# Patient Record
Sex: FEMALE | Race: WHITE | NOT HISPANIC OR LATINO | URBAN - METROPOLITAN AREA
[De-identification: names, ages, dates, MRNs, and addresses within clinical notes are randomized per-mention and may not be internally consistent; named-entity substitution may affect disease eponyms.]

---

## 2017-07-05 ENCOUNTER — OUTPATIENT (OUTPATIENT)
Dept: OUTPATIENT SERVICES | Facility: HOSPITAL | Age: 67
LOS: 1 days | End: 2017-07-05
Payer: MEDICARE

## 2017-07-05 DIAGNOSIS — Z41.9 ENCOUNTER FOR PROCEDURE FOR PURPOSES OTHER THAN REMEDYING HEALTH STATE, UNSPECIFIED: Chronic | ICD-10-CM

## 2017-07-05 DIAGNOSIS — Z96.642 PRESENCE OF LEFT ARTIFICIAL HIP JOINT: Chronic | ICD-10-CM

## 2017-07-05 PROCEDURE — 71020: CPT | Mod: 26

## 2017-07-05 PROCEDURE — 71046 X-RAY EXAM CHEST 2 VIEWS: CPT

## 2017-09-12 ENCOUNTER — APPOINTMENT (OUTPATIENT)
Dept: SURGERY | Facility: CLINIC | Age: 67
End: 2017-09-12
Payer: MEDICARE

## 2017-09-12 VITALS
SYSTOLIC BLOOD PRESSURE: 119 MMHG | WEIGHT: 96.38 LBS | HEART RATE: 82 BPM | TEMPERATURE: 98.2 F | DIASTOLIC BLOOD PRESSURE: 76 MMHG | HEIGHT: 60 IN | OXYGEN SATURATION: 98 % | BODY MASS INDEX: 18.92 KG/M2

## 2017-09-12 PROCEDURE — 99214 OFFICE O/P EST MOD 30 MIN: CPT

## 2017-09-12 RX ORDER — TERIPARATIDE 250 UG/ML
600 INJECTION, SOLUTION SUBCUTANEOUS
Refills: 0 | Status: ACTIVE | COMMUNITY

## 2017-09-12 RX ORDER — ALBUTEROL SULFATE 2.5 MG/3ML
(2.5 MG/3ML) SOLUTION RESPIRATORY (INHALATION)
Refills: 0 | Status: ACTIVE | COMMUNITY

## 2017-09-12 RX ORDER — FLUTICASONE PROPIONATE 110 UG/1
110 AEROSOL, METERED RESPIRATORY (INHALATION)
Refills: 0 | Status: ACTIVE | COMMUNITY

## 2017-09-12 RX ORDER — TOLTERODINE TARTRATE 4 MG/1
4 CAPSULE, EXTENDED RELEASE ORAL
Refills: 0 | Status: ACTIVE | COMMUNITY

## 2018-08-06 ENCOUNTER — OUTPATIENT (OUTPATIENT)
Dept: OUTPATIENT SERVICES | Facility: HOSPITAL | Age: 68
LOS: 1 days | End: 2018-08-06
Payer: MEDICARE

## 2018-08-06 DIAGNOSIS — Z41.9 ENCOUNTER FOR PROCEDURE FOR PURPOSES OTHER THAN REMEDYING HEALTH STATE, UNSPECIFIED: Chronic | ICD-10-CM

## 2018-08-06 DIAGNOSIS — Z96.642 PRESENCE OF LEFT ARTIFICIAL HIP JOINT: Chronic | ICD-10-CM

## 2018-08-06 PROCEDURE — 71046 X-RAY EXAM CHEST 2 VIEWS: CPT

## 2018-08-06 PROCEDURE — 71046 X-RAY EXAM CHEST 2 VIEWS: CPT | Mod: 26

## 2018-08-14 ENCOUNTER — APPOINTMENT (OUTPATIENT)
Dept: SURGERY | Facility: CLINIC | Age: 68
End: 2018-08-14
Payer: MEDICARE

## 2018-08-14 VITALS
DIASTOLIC BLOOD PRESSURE: 69 MMHG | HEIGHT: 60 IN | HEART RATE: 91 BPM | WEIGHT: 102.13 LBS | OXYGEN SATURATION: 97 % | BODY MASS INDEX: 20.05 KG/M2 | TEMPERATURE: 98.4 F | SYSTOLIC BLOOD PRESSURE: 102 MMHG

## 2018-08-14 DIAGNOSIS — I34.1 NONRHEUMATIC MITRAL (VALVE) PROLAPSE: ICD-10-CM

## 2018-08-14 DIAGNOSIS — B44.1 OTHER PULMONARY ASPERGILLOSIS: ICD-10-CM

## 2018-08-14 DIAGNOSIS — E78.00 PURE HYPERCHOLESTEROLEMIA, UNSPECIFIED: ICD-10-CM

## 2018-08-14 DIAGNOSIS — A31.0 PULMONARY MYCOBACTERIAL INFECTION: ICD-10-CM

## 2018-08-14 DIAGNOSIS — M19.90 UNSPECIFIED OSTEOARTHRITIS, UNSPECIFIED SITE: ICD-10-CM

## 2018-08-14 DIAGNOSIS — Z80.1 FAMILY HISTORY OF MALIGNANT NEOPLASM OF TRACHEA, BRONCHUS AND LUNG: ICD-10-CM

## 2018-08-14 DIAGNOSIS — Z60.2 PROBLEMS RELATED TO LIVING ALONE: ICD-10-CM

## 2018-08-14 DIAGNOSIS — J45.909 UNSPECIFIED ASTHMA, UNCOMPLICATED: ICD-10-CM

## 2018-08-14 DIAGNOSIS — I34.0 NONRHEUMATIC MITRAL (VALVE) INSUFFICIENCY: ICD-10-CM

## 2018-08-14 PROCEDURE — 99213 OFFICE O/P EST LOW 20 MIN: CPT

## 2018-08-14 SDOH — SOCIAL STABILITY - SOCIAL INSECURITY: PROBLEMS RELATED TO LIVING ALONE: Z60.2

## 2018-09-11 ENCOUNTER — APPOINTMENT (OUTPATIENT)
Dept: SURGERY | Facility: CLINIC | Age: 68
End: 2018-09-11
Payer: MEDICARE

## 2018-09-11 VITALS
TEMPERATURE: 97.9 F | BODY MASS INDEX: 20.05 KG/M2 | OXYGEN SATURATION: 97 % | WEIGHT: 102.13 LBS | DIASTOLIC BLOOD PRESSURE: 72 MMHG | HEIGHT: 60 IN | SYSTOLIC BLOOD PRESSURE: 99 MMHG | HEART RATE: 93 BPM

## 2018-09-11 PROCEDURE — 99213 OFFICE O/P EST LOW 20 MIN: CPT

## 2018-09-18 ENCOUNTER — OUTPATIENT (OUTPATIENT)
Dept: OUTPATIENT SERVICES | Facility: HOSPITAL | Age: 68
LOS: 1 days | End: 2018-09-18
Payer: MEDICARE

## 2018-09-18 DIAGNOSIS — Z41.9 ENCOUNTER FOR PROCEDURE FOR PURPOSES OTHER THAN REMEDYING HEALTH STATE, UNSPECIFIED: Chronic | ICD-10-CM

## 2018-09-18 DIAGNOSIS — Z96.642 PRESENCE OF LEFT ARTIFICIAL HIP JOINT: Chronic | ICD-10-CM

## 2018-09-18 PROCEDURE — 71046 X-RAY EXAM CHEST 2 VIEWS: CPT

## 2018-09-18 PROCEDURE — 71046 X-RAY EXAM CHEST 2 VIEWS: CPT | Mod: 26

## 2018-10-08 ENCOUNTER — TRANSCRIPTION ENCOUNTER (OUTPATIENT)
Age: 68
End: 2018-10-08

## 2019-03-16 ENCOUNTER — TRANSCRIPTION ENCOUNTER (OUTPATIENT)
Age: 69
End: 2019-03-16

## 2019-04-01 ENCOUNTER — OUTPATIENT (OUTPATIENT)
Dept: OUTPATIENT SERVICES | Facility: HOSPITAL | Age: 69
LOS: 1 days | End: 2019-04-01
Payer: MEDICARE

## 2019-04-01 DIAGNOSIS — Z41.9 ENCOUNTER FOR PROCEDURE FOR PURPOSES OTHER THAN REMEDYING HEALTH STATE, UNSPECIFIED: Chronic | ICD-10-CM

## 2019-04-01 DIAGNOSIS — Z96.642 PRESENCE OF LEFT ARTIFICIAL HIP JOINT: Chronic | ICD-10-CM

## 2019-04-01 PROCEDURE — 71046 X-RAY EXAM CHEST 2 VIEWS: CPT | Mod: 26

## 2019-04-01 PROCEDURE — 71046 X-RAY EXAM CHEST 2 VIEWS: CPT

## 2019-04-11 ENCOUNTER — OUTPATIENT (OUTPATIENT)
Dept: OUTPATIENT SERVICES | Facility: HOSPITAL | Age: 69
LOS: 1 days | Discharge: HOME | End: 2019-04-11
Payer: MEDICARE

## 2019-04-11 ENCOUNTER — RESULT REVIEW (OUTPATIENT)
Age: 69
End: 2019-04-11

## 2019-04-11 DIAGNOSIS — Z41.9 ENCOUNTER FOR PROCEDURE FOR PURPOSES OTHER THAN REMEDYING HEALTH STATE, UNSPECIFIED: Chronic | ICD-10-CM

## 2019-04-11 DIAGNOSIS — Z96.642 PRESENCE OF LEFT ARTIFICIAL HIP JOINT: Chronic | ICD-10-CM

## 2019-04-11 LAB
GRAM STN FLD: SIGNIFICANT CHANGE UP
SPECIMEN SOURCE: SIGNIFICANT CHANGE UP

## 2019-04-11 PROCEDURE — 88112 CYTOPATH CELL ENHANCE TECH: CPT

## 2019-04-11 PROCEDURE — 87070 CULTURE OTHR SPECIMN AEROBIC: CPT

## 2019-04-11 PROCEDURE — 87015 SPECIMEN INFECT AGNT CONCNTJ: CPT

## 2019-04-11 PROCEDURE — 87206 SMEAR FLUORESCENT/ACID STAI: CPT

## 2019-04-11 PROCEDURE — 87116 MYCOBACTERIA CULTURE: CPT

## 2019-04-11 PROCEDURE — 88305 TISSUE EXAM BY PATHOLOGIST: CPT

## 2019-04-11 PROCEDURE — 87102 FUNGUS ISOLATION CULTURE: CPT

## 2019-04-11 PROCEDURE — 88312 SPECIAL STAINS GROUP 1: CPT

## 2019-04-11 PROCEDURE — 31624 DX BRONCHOSCOPE/LAVAGE: CPT

## 2019-04-11 PROCEDURE — 87305 ASPERGILLUS AG IA: CPT

## 2019-04-11 PROCEDURE — 87449 NOS EACH ORGANISM AG IA: CPT

## 2019-04-12 LAB
NIGHT BLUE STAIN TISS: SIGNIFICANT CHANGE UP
SPECIMEN SOURCE: SIGNIFICANT CHANGE UP

## 2019-04-13 LAB
CULTURE RESULTS: SIGNIFICANT CHANGE UP
FUNGITELL B-D-GLUCAN,  BRONCHIAL WASH: SIGNIFICANT CHANGE UP
SPECIMEN SOURCE: SIGNIFICANT CHANGE UP

## 2019-04-14 LAB — GALACTOMANNAN AG SPEC IA-ACNC: <0.5 INDEX — SIGNIFICANT CHANGE UP

## 2019-04-15 LAB — NON-GYNECOLOGICAL CYTOLOGY STUDY: SIGNIFICANT CHANGE UP

## 2019-05-11 LAB
CULTURE RESULTS: SIGNIFICANT CHANGE UP
SPECIMEN SOURCE: SIGNIFICANT CHANGE UP

## 2019-07-24 ENCOUNTER — APPOINTMENT (OUTPATIENT)
Dept: ULTRASOUND IMAGING | Facility: HOSPITAL | Age: 69
End: 2019-07-24
Payer: MEDICARE

## 2019-07-24 ENCOUNTER — OUTPATIENT (OUTPATIENT)
Dept: OUTPATIENT SERVICES | Facility: HOSPITAL | Age: 69
LOS: 1 days | End: 2019-07-24
Payer: MEDICARE

## 2019-07-24 DIAGNOSIS — Z41.9 ENCOUNTER FOR PROCEDURE FOR PURPOSES OTHER THAN REMEDYING HEALTH STATE, UNSPECIFIED: Chronic | ICD-10-CM

## 2019-07-24 DIAGNOSIS — Z96.642 PRESENCE OF LEFT ARTIFICIAL HIP JOINT: Chronic | ICD-10-CM

## 2019-07-24 PROCEDURE — 76700 US EXAM ABDOM COMPLETE: CPT

## 2019-07-24 PROCEDURE — 76700 US EXAM ABDOM COMPLETE: CPT | Mod: 26

## 2019-07-25 LAB
CULTURE RESULTS: SIGNIFICANT CHANGE UP
SPECIMEN SOURCE: SIGNIFICANT CHANGE UP

## 2019-08-15 ENCOUNTER — OUTPATIENT (OUTPATIENT)
Dept: OUTPATIENT SERVICES | Facility: HOSPITAL | Age: 69
LOS: 1 days | End: 2019-08-15
Payer: MEDICARE

## 2019-08-15 ENCOUNTER — APPOINTMENT (OUTPATIENT)
Dept: MRI IMAGING | Facility: HOSPITAL | Age: 69
End: 2019-08-15
Payer: MEDICARE

## 2019-08-15 DIAGNOSIS — Z41.9 ENCOUNTER FOR PROCEDURE FOR PURPOSES OTHER THAN REMEDYING HEALTH STATE, UNSPECIFIED: Chronic | ICD-10-CM

## 2019-08-15 DIAGNOSIS — Z96.642 PRESENCE OF LEFT ARTIFICIAL HIP JOINT: Chronic | ICD-10-CM

## 2019-08-15 PROCEDURE — A9585: CPT

## 2019-08-15 PROCEDURE — 74183 MRI ABD W/O CNTR FLWD CNTR: CPT

## 2019-08-15 PROCEDURE — 74183 MRI ABD W/O CNTR FLWD CNTR: CPT | Mod: 26

## 2019-08-27 ENCOUNTER — APPOINTMENT (OUTPATIENT)
Dept: SURGERY | Facility: CLINIC | Age: 69
End: 2019-08-27
Payer: MEDICARE

## 2019-08-27 VITALS
BODY MASS INDEX: 20.64 KG/M2 | HEIGHT: 60 IN | HEART RATE: 101 BPM | SYSTOLIC BLOOD PRESSURE: 97 MMHG | DIASTOLIC BLOOD PRESSURE: 62 MMHG | WEIGHT: 105.13 LBS | TEMPERATURE: 97.1 F | OXYGEN SATURATION: 98 %

## 2019-08-27 PROCEDURE — 99213 OFFICE O/P EST LOW 20 MIN: CPT

## 2019-08-27 NOTE — ASSESSMENT
[FreeTextEntry1] : 70 y/o F presents here today for follow up on a small benign cystic lesion to her pancreas. Patient appears well. I reviewed the patient's recent MRI which shows the cystic lesion to be slightly larger than previous but with no remarkable concerns. Abdominal US completed could not detect any cystic lesion. The cystic is likely benign. Will have patient obtain blood work to evaluate for any abnormality. Will follow up here in 6 months with a repeat MRI. Will return here sooner if the blood tests shows any abnormality.

## 2019-08-27 NOTE — ADDENDUM
[FreeTextEntry1] : Documented by Audrey Senior acting as a scribe for Dr. Priyank Fry on 08/27/2019\par

## 2019-08-27 NOTE — HISTORY OF PRESENT ILLNESS
[de-identified] : 70 y/o F presents here today for follow up on a small benign cystic lesion to her pancreas. She reports here feeling well. She denies any pain, loss of appetite, abdominal pain, or unintentional weight loss.

## 2019-08-27 NOTE — END OF VISIT
[FreeTextEntry3] : All medical record entries made by the Scribe were at my, Dr. Fry's direction and personally dictated by me on 08/27/2019  I have reviewed the chart and agree that the record accurately reflects my personal performance of the history, physical exam, assessment and plan. I have also personally directed, reviewed, and agreed with the chart.\par \par

## 2019-08-28 LAB
CANCER AG125 SERPL-ACNC: 16 U/ML
CANCER AG19-9 SERPL-ACNC: 8 U/ML
CEA SERPL-MCNC: 0.9 NG/ML

## 2020-02-17 ENCOUNTER — OUTPATIENT (OUTPATIENT)
Dept: OUTPATIENT SERVICES | Facility: HOSPITAL | Age: 70
LOS: 1 days | End: 2020-02-17

## 2020-02-17 ENCOUNTER — APPOINTMENT (OUTPATIENT)
Dept: MRI IMAGING | Facility: CLINIC | Age: 70
End: 2020-02-17
Payer: MEDICARE

## 2020-02-17 DIAGNOSIS — Z96.642 PRESENCE OF LEFT ARTIFICIAL HIP JOINT: Chronic | ICD-10-CM

## 2020-02-17 DIAGNOSIS — Z41.9 ENCOUNTER FOR PROCEDURE FOR PURPOSES OTHER THAN REMEDYING HEALTH STATE, UNSPECIFIED: Chronic | ICD-10-CM

## 2020-02-17 PROCEDURE — 74183 MRI ABD W/O CNTR FLWD CNTR: CPT | Mod: 26

## 2020-02-25 ENCOUNTER — APPOINTMENT (OUTPATIENT)
Dept: SURGERY | Facility: CLINIC | Age: 70
End: 2020-02-25
Payer: MEDICARE

## 2020-02-25 VITALS
OXYGEN SATURATION: 98 % | SYSTOLIC BLOOD PRESSURE: 109 MMHG | BODY MASS INDEX: 21.84 KG/M2 | HEART RATE: 85 BPM | WEIGHT: 111.25 LBS | DIASTOLIC BLOOD PRESSURE: 73 MMHG | TEMPERATURE: 97.4 F | HEIGHT: 60 IN

## 2020-02-25 PROCEDURE — 99213 OFFICE O/P EST LOW 20 MIN: CPT

## 2020-02-25 NOTE — ASSESSMENT
[FreeTextEntry1] : 68 y/o F with small benign cystic lesion on the pancreas. Patient is doing well. She is stable and asymptomatic. MRI results reviewed shows the cyst to be stable with no changes, measuring at 0.5cm (same size as previous). Blood work also reviewed t be WNL, tumor markers were negative. The previous US also shows patient to have a 0.5cm gallbladder polyp. She is to go for a repeat US now to monitor this. Will continue to monitor patient with a repeat MRI in 6 months and also to go for repeat blood work with tumor markers.

## 2020-02-25 NOTE — ADDENDUM
[FreeTextEntry1] : This note was written by Gracy Haddad on 02/25/2020 acting as scribe for Dr. Ogpar

## 2020-02-25 NOTE — HISTORY OF PRESENT ILLNESS
[de-identified] : 70 y/o F with hx of asthma, HLD, small benign cystic lesion on the pancreas, presents here for follow up. She reports here feeling well. She was previously sent to obtain an MRI and blood work to monitor the pancreatic cyst and is here to review the results. She denies any abdominal pain, back pain or unintentional weight loss.\par \par

## 2020-02-25 NOTE — END OF VISIT
[FreeTextEntry3] : All medical record entries made by the Scribe were at my, Dr. Fry's, discretion and personally dictated by me on 02/25/2020. I have reviewed the chart and agree that the record accurately reflects my personal performance of the history, physical exam, assessment and plan. I have also personally directed, reviewed and agreed to the chart.\par

## 2020-03-02 ENCOUNTER — APPOINTMENT (OUTPATIENT)
Dept: ULTRASOUND IMAGING | Facility: HOSPITAL | Age: 70
End: 2020-03-02
Payer: MEDICARE

## 2020-03-02 ENCOUNTER — OUTPATIENT (OUTPATIENT)
Dept: OUTPATIENT SERVICES | Facility: HOSPITAL | Age: 70
LOS: 1 days | End: 2020-03-02
Payer: MEDICARE

## 2020-03-02 DIAGNOSIS — Z41.9 ENCOUNTER FOR PROCEDURE FOR PURPOSES OTHER THAN REMEDYING HEALTH STATE, UNSPECIFIED: Chronic | ICD-10-CM

## 2020-03-02 DIAGNOSIS — Z96.642 PRESENCE OF LEFT ARTIFICIAL HIP JOINT: Chronic | ICD-10-CM

## 2020-03-02 PROCEDURE — 76700 US EXAM ABDOM COMPLETE: CPT

## 2020-03-02 PROCEDURE — 76700 US EXAM ABDOM COMPLETE: CPT | Mod: 26

## 2020-03-06 ENCOUNTER — OUTPATIENT (OUTPATIENT)
Dept: OUTPATIENT SERVICES | Facility: HOSPITAL | Age: 70
LOS: 1 days | End: 2020-03-06
Payer: MEDICARE

## 2020-03-06 DIAGNOSIS — Z96.642 PRESENCE OF LEFT ARTIFICIAL HIP JOINT: Chronic | ICD-10-CM

## 2020-03-06 DIAGNOSIS — Z41.9 ENCOUNTER FOR PROCEDURE FOR PURPOSES OTHER THAN REMEDYING HEALTH STATE, UNSPECIFIED: Chronic | ICD-10-CM

## 2020-03-06 PROCEDURE — 71046 X-RAY EXAM CHEST 2 VIEWS: CPT

## 2020-03-06 PROCEDURE — 71046 X-RAY EXAM CHEST 2 VIEWS: CPT | Mod: 26

## 2020-04-02 LAB
CANCER AG125 SERPL-ACNC: 17 U/ML
CANCER AG19-9 SERPL-ACNC: 8 U/ML
CEA SERPL-MCNC: 1 NG/ML

## 2020-08-25 ENCOUNTER — APPOINTMENT (OUTPATIENT)
Dept: SURGERY | Facility: CLINIC | Age: 70
End: 2020-08-25
Payer: MEDICARE

## 2020-08-25 VITALS
OXYGEN SATURATION: 98 % | TEMPERATURE: 97.2 F | SYSTOLIC BLOOD PRESSURE: 116 MMHG | HEIGHT: 60 IN | HEART RATE: 82 BPM | WEIGHT: 101 LBS | BODY MASS INDEX: 19.83 KG/M2 | DIASTOLIC BLOOD PRESSURE: 74 MMHG

## 2020-08-25 PROCEDURE — 99213 OFFICE O/P EST LOW 20 MIN: CPT

## 2020-08-25 NOTE — HISTORY OF PRESENT ILLNESS
[de-identified] : 71 y/o F with PMHx of asthma, HLD, small benign cystic lesion on the pancreas presents for 6 month follow up. Pt is doing well overall. She had an MRI in February which showed the cystic lesion to be stable.

## 2020-08-25 NOTE — ASSESSMENT
[FreeTextEntry1] : 71 y/o F with PMHx of asthma, HLD, small benign cystic lesion on the pancreas presents for 6 month follow up. We reviewed patient's MRI from 02/20, which demonstrates stable, cystic lesion, and blood tests for tumor markers from 02/25/2020, which were all normal. We discussed US done on 3/2/2020, which demonstrates a gall bladder polyp but no visualization of the cystic lesion. Pt is doing well overall. I wrote a prescription for an MRI that patient should have in ~6 months and then she should follow up here in February 2021.

## 2020-08-25 NOTE — END OF VISIT
[FreeTextEntry3] : All medical record entries made by the Scribe were at my, Dr. Fry's, discretion and personally dictated by me on 08/25/2020. I have reviewed the chart and agree that the record accurately reflects my personal performance of the history, physical exam, assessment and plan. I have also personally directed, reviewed and agreed to the chart.

## 2021-01-26 ENCOUNTER — RESULT REVIEW (OUTPATIENT)
Age: 71
End: 2021-01-26

## 2021-01-26 ENCOUNTER — APPOINTMENT (OUTPATIENT)
Dept: MRI IMAGING | Facility: HOSPITAL | Age: 71
End: 2021-01-26

## 2021-01-26 ENCOUNTER — OUTPATIENT (OUTPATIENT)
Dept: OUTPATIENT SERVICES | Facility: HOSPITAL | Age: 71
LOS: 1 days | End: 2021-01-26
Payer: MEDICARE

## 2021-01-26 DIAGNOSIS — Z41.9 ENCOUNTER FOR PROCEDURE FOR PURPOSES OTHER THAN REMEDYING HEALTH STATE, UNSPECIFIED: Chronic | ICD-10-CM

## 2021-01-26 DIAGNOSIS — Z96.642 PRESENCE OF LEFT ARTIFICIAL HIP JOINT: Chronic | ICD-10-CM

## 2021-01-26 PROCEDURE — 74183 MRI ABD W/O CNTR FLWD CNTR: CPT | Mod: 26,MH

## 2021-01-26 PROCEDURE — 74183 MRI ABD W/O CNTR FLWD CNTR: CPT

## 2021-01-26 PROCEDURE — A9585: CPT

## 2021-01-27 ENCOUNTER — NON-APPOINTMENT (OUTPATIENT)
Age: 71
End: 2021-01-27

## 2021-03-02 ENCOUNTER — APPOINTMENT (OUTPATIENT)
Dept: SURGERY | Facility: CLINIC | Age: 71
End: 2021-03-02
Payer: MEDICARE

## 2021-03-02 VITALS
DIASTOLIC BLOOD PRESSURE: 71 MMHG | HEART RATE: 102 BPM | TEMPERATURE: 97.9 F | WEIGHT: 98.5 LBS | BODY MASS INDEX: 19.34 KG/M2 | HEIGHT: 60 IN | OXYGEN SATURATION: 97 % | SYSTOLIC BLOOD PRESSURE: 117 MMHG

## 2021-03-02 DIAGNOSIS — K83.8 OTHER SPECIFIED DISEASES OF BILIARY TRACT: ICD-10-CM

## 2021-03-02 PROCEDURE — 99213 OFFICE O/P EST LOW 20 MIN: CPT

## 2021-03-02 NOTE — HISTORY OF PRESENT ILLNESS
[de-identified] : 71 y/o F with PMHx of asthma, HLD, small benign cystic lesion on the pancreas presents for follow up. She is doing well overall. States she is asymptomatic. MRI of abdomen done 1/27/21 demonstrates stable pancreatic cystic lesion, common bile duct at upper limits normal in caliber and focal adenomyomatosis gallbladder fundus. Pt states she has gotten a COVID vaccine.

## 2021-03-02 NOTE — ASSESSMENT
[FreeTextEntry1] : 71 y/o F with PMHx of asthma, HLD, small benign cystic lesion on the pancreas presents for follow up. We reviewed recent MRI of abdomen done 1/27/21, which demonstrates stable pancreatic cystic lesion, common bile duct at upper limits normal in caliber and focal adenomyomatosis gallbladder fundus. I informed pt that the cyst is stable and the gallbladder findings are not very concerning and do not need to be addressed. As patient is asymptomatic, will continue with regular surveillance every 6 months.

## 2021-03-02 NOTE — END OF VISIT
[FreeTextEntry3] : All medical record entries made by the Scribe were at my, Dr. Fry's, discretion and personally dictated by me on 03/02/2021. I have reviewed the chart and agree that the record accurately reflects my personal performance of the history, physical exam, assessment and plan. I have also personally directed, reviewed and agreed to the chart.

## 2021-03-02 NOTE — ADDENDUM
[FreeTextEntry1] : This note was written by Gracy Haddad on 03/02/2021 acting as scribe for Dr. Fry.

## 2021-08-24 ENCOUNTER — OUTPATIENT (OUTPATIENT)
Dept: OUTPATIENT SERVICES | Facility: HOSPITAL | Age: 71
LOS: 1 days | End: 2021-08-24
Payer: MEDICARE

## 2021-08-24 ENCOUNTER — APPOINTMENT (OUTPATIENT)
Dept: ULTRASOUND IMAGING | Facility: HOSPITAL | Age: 71
End: 2021-08-24
Payer: MEDICARE

## 2021-08-24 DIAGNOSIS — Z41.9 ENCOUNTER FOR PROCEDURE FOR PURPOSES OTHER THAN REMEDYING HEALTH STATE, UNSPECIFIED: Chronic | ICD-10-CM

## 2021-08-24 DIAGNOSIS — Z96.642 PRESENCE OF LEFT ARTIFICIAL HIP JOINT: Chronic | ICD-10-CM

## 2021-08-24 PROCEDURE — 76700 US EXAM ABDOM COMPLETE: CPT | Mod: 26

## 2021-08-24 PROCEDURE — 76700 US EXAM ABDOM COMPLETE: CPT

## 2021-09-14 ENCOUNTER — RESULT REVIEW (OUTPATIENT)
Age: 71
End: 2021-09-14

## 2021-09-14 ENCOUNTER — APPOINTMENT (OUTPATIENT)
Dept: SURGERY | Facility: CLINIC | Age: 71
End: 2021-09-14
Payer: MEDICARE

## 2021-09-14 VITALS
WEIGHT: 100 LBS | SYSTOLIC BLOOD PRESSURE: 117 MMHG | HEART RATE: 84 BPM | BODY MASS INDEX: 19.63 KG/M2 | HEIGHT: 60 IN | DIASTOLIC BLOOD PRESSURE: 79 MMHG | OXYGEN SATURATION: 97 % | TEMPERATURE: 97.6 F

## 2021-09-14 PROCEDURE — 99213 OFFICE O/P EST LOW 20 MIN: CPT

## 2022-02-24 ENCOUNTER — OUTPATIENT (OUTPATIENT)
Dept: OUTPATIENT SERVICES | Facility: HOSPITAL | Age: 72
LOS: 1 days | End: 2022-02-24
Payer: MEDICARE

## 2022-02-24 DIAGNOSIS — Z41.9 ENCOUNTER FOR PROCEDURE FOR PURPOSES OTHER THAN REMEDYING HEALTH STATE, UNSPECIFIED: Chronic | ICD-10-CM

## 2022-02-24 DIAGNOSIS — Z96.642 PRESENCE OF LEFT ARTIFICIAL HIP JOINT: Chronic | ICD-10-CM

## 2022-02-24 PROCEDURE — 71046 X-RAY EXAM CHEST 2 VIEWS: CPT

## 2022-02-24 PROCEDURE — 71046 X-RAY EXAM CHEST 2 VIEWS: CPT | Mod: 26

## 2022-03-15 ENCOUNTER — NON-APPOINTMENT (OUTPATIENT)
Age: 72
End: 2022-03-15

## 2022-03-16 ENCOUNTER — OUTPATIENT (OUTPATIENT)
Dept: OUTPATIENT SERVICES | Facility: HOSPITAL | Age: 72
LOS: 1 days | End: 2022-03-16
Payer: MEDICARE

## 2022-03-16 ENCOUNTER — APPOINTMENT (OUTPATIENT)
Dept: ULTRASOUND IMAGING | Facility: HOSPITAL | Age: 72
End: 2022-03-16
Payer: MEDICARE

## 2022-03-16 ENCOUNTER — APPOINTMENT (OUTPATIENT)
Dept: MRI IMAGING | Facility: HOSPITAL | Age: 72
End: 2022-03-16
Payer: MEDICARE

## 2022-03-16 ENCOUNTER — APPOINTMENT (OUTPATIENT)
Dept: ORTHOPEDIC SURGERY | Facility: CLINIC | Age: 72
End: 2022-03-16
Payer: MEDICARE

## 2022-03-16 VITALS — RESPIRATION RATE: 14 BRPM | HEIGHT: 60 IN | BODY MASS INDEX: 19.63 KG/M2 | WEIGHT: 100 LBS

## 2022-03-16 DIAGNOSIS — Z41.9 ENCOUNTER FOR PROCEDURE FOR PURPOSES OTHER THAN REMEDYING HEALTH STATE, UNSPECIFIED: Chronic | ICD-10-CM

## 2022-03-16 DIAGNOSIS — Z96.642 PRESENCE OF LEFT ARTIFICIAL HIP JOINT: Chronic | ICD-10-CM

## 2022-03-16 PROCEDURE — 76705 ECHO EXAM OF ABDOMEN: CPT | Mod: 26

## 2022-03-16 PROCEDURE — 74183 MRI ABD W/O CNTR FLWD CNTR: CPT

## 2022-03-16 PROCEDURE — A9585: CPT

## 2022-03-16 PROCEDURE — 74183 MRI ABD W/O CNTR FLWD CNTR: CPT | Mod: 26,MH

## 2022-03-16 PROCEDURE — 73130 X-RAY EXAM OF HAND: CPT | Mod: 50

## 2022-03-16 PROCEDURE — 20605 DRAIN/INJ JOINT/BURSA W/O US: CPT

## 2022-03-16 PROCEDURE — 99204 OFFICE O/P NEW MOD 45 MIN: CPT

## 2022-03-16 PROCEDURE — 76705 ECHO EXAM OF ABDOMEN: CPT

## 2022-04-15 ENCOUNTER — NON-APPOINTMENT (OUTPATIENT)
Age: 72
End: 2022-04-15

## 2022-04-15 ENCOUNTER — APPOINTMENT (OUTPATIENT)
Dept: SURGERY | Facility: CLINIC | Age: 72
End: 2022-04-15
Payer: MEDICARE

## 2022-04-15 ENCOUNTER — APPOINTMENT (OUTPATIENT)
Dept: SURGERY | Facility: CLINIC | Age: 72
End: 2022-04-15

## 2022-04-15 VITALS
TEMPERATURE: 97.1 F | HEIGHT: 60 IN | BODY MASS INDEX: 19.63 KG/M2 | WEIGHT: 100 LBS | SYSTOLIC BLOOD PRESSURE: 152 MMHG | DIASTOLIC BLOOD PRESSURE: 89 MMHG | OXYGEN SATURATION: 99 % | HEART RATE: 90 BPM

## 2022-04-15 DIAGNOSIS — K82.4 CHOLESTEROLOSIS OF GALLBLADDER: ICD-10-CM

## 2022-04-15 PROCEDURE — 99213 OFFICE O/P EST LOW 20 MIN: CPT

## 2022-04-15 NOTE — ADDENDUM
[FreeTextEntry1] : This note was written by Gracy Haddad on 09/14/2021 acting as scribe for Dr. Fry

## 2022-04-15 NOTE — PLAN
[FreeTextEntry1] : tumor marker labs\par cmp\par f/u 6 mo for surveillance MRI and US, consider annual checks thereafter if stable\par continue to follow pcp

## 2022-04-15 NOTE — HISTORY OF PRESENT ILLNESS
[de-identified] : Pt is a 72 y/o F with PMHx of asthma, HLD, small benign cystic lesion on the pancreas presents for 6 month follow up. She is doing well overall. States she continues to be asymptomatic. Denies pain, nausea, vomiting, loss of appetite.  Abdominal US 8/24/21 demonstrates since 3/2/20, interval increase in size of 0.6 cm gallbladder polyp.

## 2022-04-15 NOTE — ASSESSMENT
[FreeTextEntry1] : Pt is a 72 y/o F with PMHx of asthma, HLD, small benign cystic lesion on the pancreas presents for 6 month follow up. Abdominal US 8/24/21 demonstrates since 3/2/20, interval increase in size of 0.6 cm gallbladder polyp. Discussed with patient how I do not recommend operating at this point, but if the polyp increases to 1 cm or more in size, I would most likely recommend an operation. Will order abdominal MRI and US and pt will follow up in 6 months.

## 2022-04-15 NOTE — HISTORY OF PRESENT ILLNESS
[de-identified] : Pt is a 70 y/o F with PMHx of asthma, HLD, small benign cystic lesion on the pancreas presents for 6 month follow up. She is doing well overall. States she continues to be asymptomatic. Denies pain, nausea, vomiting, loss of appetite.  Pt went for annual surveillance exam, MRI and US Abdomen. Pt MRI revealed 0.5cm simple cystic lesion of pancreatic head. US revealed stable 0.7cm GB polyp. Pt mentioned she recently in January had an US, prior to ours in March, revealing 2 small gallstones. Pt most recent US does not reveal this. Explained to pt this may be due to the gallstones being very small. Pt denies any RUQ pn, n,v,fevers. Will continue surveillance MRI and abd US, repeat in 6 mo and can consider annual surveillance thereafter if stable. Pt previously had tumor makers monitored with , will send for repeat labs and cmp

## 2022-04-18 LAB
ALBUMIN SERPL ELPH-MCNC: 4.3 G/DL
ALP BLD-CCNC: 70 U/L
ALT SERPL-CCNC: 18 U/L
ANION GAP SERPL CALC-SCNC: 12 MMOL/L
AST SERPL-CCNC: 24 U/L
BILIRUB SERPL-MCNC: 0.3 MG/DL
BUN SERPL-MCNC: 15 MG/DL
CALCIUM SERPL-MCNC: 9.5 MG/DL
CANCER AG125 SERPL-ACNC: 24 U/ML
CANCER AG19-9 SERPL-ACNC: 11 U/ML
CEA SERPL-MCNC: 0.6 NG/ML
CHLORIDE SERPL-SCNC: 103 MMOL/L
CO2 SERPL-SCNC: 26 MMOL/L
CREAT SERPL-MCNC: 0.61 MG/DL
EGFR: 96 ML/MIN/1.73M2
GLUCOSE SERPL-MCNC: 88 MG/DL
POTASSIUM SERPL-SCNC: 4.2 MMOL/L
PROT SERPL-MCNC: 6.5 G/DL
SODIUM SERPL-SCNC: 141 MMOL/L

## 2022-04-21 ENCOUNTER — APPOINTMENT (OUTPATIENT)
Dept: ORTHOPEDIC SURGERY | Facility: CLINIC | Age: 72
End: 2022-04-21
Payer: MEDICARE

## 2022-04-21 ENCOUNTER — NON-APPOINTMENT (OUTPATIENT)
Age: 72
End: 2022-04-21

## 2022-04-21 PROCEDURE — 99443: CPT | Mod: 95

## 2022-06-14 ENCOUNTER — APPOINTMENT (OUTPATIENT)
Dept: ORTHOPEDIC SURGERY | Facility: CLINIC | Age: 72
End: 2022-06-14
Payer: MEDICARE

## 2022-06-14 VITALS — WEIGHT: 100 LBS | BODY MASS INDEX: 19.63 KG/M2 | RESPIRATION RATE: 16 BRPM | HEIGHT: 60 IN

## 2022-06-14 PROCEDURE — 20605 DRAIN/INJ JOINT/BURSA W/O US: CPT

## 2022-06-14 PROCEDURE — 99214 OFFICE O/P EST MOD 30 MIN: CPT | Mod: 25

## 2022-10-17 ENCOUNTER — RESULT REVIEW (OUTPATIENT)
Age: 72
End: 2022-10-17

## 2022-10-27 ENCOUNTER — APPOINTMENT (OUTPATIENT)
Dept: MRI IMAGING | Facility: HOSPITAL | Age: 72
End: 2022-10-27

## 2022-10-27 ENCOUNTER — APPOINTMENT (OUTPATIENT)
Dept: ULTRASOUND IMAGING | Facility: HOSPITAL | Age: 72
End: 2022-10-27

## 2022-10-27 ENCOUNTER — OUTPATIENT (OUTPATIENT)
Dept: OUTPATIENT SERVICES | Facility: HOSPITAL | Age: 72
LOS: 1 days | End: 2022-10-27
Payer: MEDICARE

## 2022-10-27 DIAGNOSIS — Z41.9 ENCOUNTER FOR PROCEDURE FOR PURPOSES OTHER THAN REMEDYING HEALTH STATE, UNSPECIFIED: Chronic | ICD-10-CM

## 2022-10-27 DIAGNOSIS — Z96.642 PRESENCE OF LEFT ARTIFICIAL HIP JOINT: Chronic | ICD-10-CM

## 2022-10-27 PROCEDURE — 74183 MRI ABD W/O CNTR FLWD CNTR: CPT | Mod: 26,MH

## 2022-10-27 PROCEDURE — 76705 ECHO EXAM OF ABDOMEN: CPT

## 2022-10-27 PROCEDURE — 76705 ECHO EXAM OF ABDOMEN: CPT | Mod: 26

## 2022-10-27 PROCEDURE — A9585: CPT

## 2022-10-27 PROCEDURE — 74183 MRI ABD W/O CNTR FLWD CNTR: CPT

## 2022-11-08 ENCOUNTER — APPOINTMENT (OUTPATIENT)
Dept: SURGERY | Facility: CLINIC | Age: 72
End: 2022-11-08

## 2022-12-15 ENCOUNTER — NON-APPOINTMENT (OUTPATIENT)
Age: 72
End: 2022-12-15

## 2022-12-19 ENCOUNTER — APPOINTMENT (OUTPATIENT)
Dept: ORTHOPEDIC SURGERY | Facility: CLINIC | Age: 72
End: 2022-12-19

## 2022-12-19 VITALS — HEIGHT: 60 IN | WEIGHT: 100 LBS | RESPIRATION RATE: 16 BRPM | BODY MASS INDEX: 19.63 KG/M2

## 2022-12-19 PROCEDURE — 20605 DRAIN/INJ JOINT/BURSA W/O US: CPT

## 2022-12-19 PROCEDURE — 99214 OFFICE O/P EST MOD 30 MIN: CPT | Mod: 25

## 2022-12-19 PROCEDURE — 73140 X-RAY EXAM OF FINGER(S): CPT | Mod: FA

## 2022-12-19 PROCEDURE — 73110 X-RAY EXAM OF WRIST: CPT | Mod: 50

## 2023-04-10 LAB
CANCER AG125 SERPL-ACNC: 23 U/ML
CANCER AG19-9 SERPL-ACNC: 9 U/ML
CEA SERPL-MCNC: 0.9 NG/ML

## 2023-07-10 LAB
CANCER AG125 SERPL-ACNC: 17 U/ML
CANCER AG19-9 SERPL-ACNC: 7 U/ML
CEA SERPL-MCNC: 0.7 NG/ML

## 2023-07-17 ENCOUNTER — RESULT REVIEW (OUTPATIENT)
Age: 73
End: 2023-07-17

## 2023-07-17 ENCOUNTER — APPOINTMENT (OUTPATIENT)
Dept: MRI IMAGING | Facility: HOSPITAL | Age: 73
End: 2023-07-17

## 2023-07-17 ENCOUNTER — OUTPATIENT (OUTPATIENT)
Dept: OUTPATIENT SERVICES | Facility: HOSPITAL | Age: 73
LOS: 1 days | End: 2023-07-17
Payer: MEDICARE

## 2023-07-17 DIAGNOSIS — Z41.9 ENCOUNTER FOR PROCEDURE FOR PURPOSES OTHER THAN REMEDYING HEALTH STATE, UNSPECIFIED: Chronic | ICD-10-CM

## 2023-07-17 DIAGNOSIS — Z96.642 PRESENCE OF LEFT ARTIFICIAL HIP JOINT: Chronic | ICD-10-CM

## 2023-07-17 PROCEDURE — 74183 MRI ABD W/O CNTR FLWD CNTR: CPT | Mod: MH

## 2023-07-17 PROCEDURE — A9585: CPT

## 2023-07-17 PROCEDURE — 74183 MRI ABD W/O CNTR FLWD CNTR: CPT | Mod: 26,MH

## 2023-07-26 ENCOUNTER — APPOINTMENT (OUTPATIENT)
Dept: BARIATRICS | Facility: CLINIC | Age: 73
End: 2023-07-26
Payer: MEDICARE

## 2023-07-26 VITALS
HEIGHT: 60 IN | SYSTOLIC BLOOD PRESSURE: 107 MMHG | BODY MASS INDEX: 20.47 KG/M2 | OXYGEN SATURATION: 98 % | DIASTOLIC BLOOD PRESSURE: 63 MMHG | TEMPERATURE: 98.1 F | WEIGHT: 104.25 LBS | HEART RATE: 79 BPM

## 2023-07-26 DIAGNOSIS — K86.9 DISEASE OF PANCREAS, UNSPECIFIED: ICD-10-CM

## 2023-07-26 PROCEDURE — 99214 OFFICE O/P EST MOD 30 MIN: CPT

## 2023-07-26 NOTE — HISTORY OF PRESENT ILLNESS
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 Pt has been obtaining annual MRI's for surveillance of pancreatic cyst which was most recently performed on 7/17/23 with no change in size since 2011. Tumor markers negative. Pt obtained US in October 2022 to monitor GB polyp which is unchanged and radiology recommending no further surveillance. Pt denies any associated RUQ pn, n,v. Pt advised to contact us should she develop any GB symptoms. Pt otherwise doing great and will obtain MRI next year to monitor pancreatic IMPN. No other concerns at this time.

## 2023-07-26 NOTE — ASSESSMENT
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 Pt has been obtaining annual MRI's for surveillance of pancreatic cyst which was most recently performed on 7/17/23 with no change in size since 2011. Tumor markers negative. Pt obtained US in October 2022 to monitor GB polyp which is unchanged and radiology recommending no further surveillance. Pt denies any associated RUQ pn, n,v. Pt advised to contact us should she develop any GB symptoms. Pt otherwise doing great and will obtain MRI next year to monitor pancreatic IMPN. No other concerns at this time.

## 2023-07-27 ENCOUNTER — APPOINTMENT (OUTPATIENT)
Dept: ORTHOPEDIC SURGERY | Facility: CLINIC | Age: 73
End: 2023-07-27
Payer: MEDICARE

## 2023-07-27 VITALS — BODY MASS INDEX: 20.42 KG/M2 | RESPIRATION RATE: 16 BRPM | HEIGHT: 60 IN | WEIGHT: 104 LBS

## 2023-07-27 DIAGNOSIS — M79.642 PAIN IN LEFT HAND: ICD-10-CM

## 2023-07-27 DIAGNOSIS — M25.522 PAIN IN LEFT ELBOW: ICD-10-CM

## 2023-07-27 PROCEDURE — 99214 OFFICE O/P EST MOD 30 MIN: CPT | Mod: 25

## 2023-07-27 PROCEDURE — 20605 DRAIN/INJ JOINT/BURSA W/O US: CPT | Mod: LT

## 2023-07-27 PROCEDURE — 73070 X-RAY EXAM OF ELBOW: CPT | Mod: LT

## 2023-09-21 NOTE — ADDENDUM
[FreeTextEntry1] : This note was written by Gracy Haddad on 08/25/2020 acting as scribe for Dr. Fry  Ipl Recommendations: Telangiectasias are permanently dilated blood vessels that remain visible on the skin surface.  They are caused by sun damaging the elastin in the blood vessel walls.  Laser is the only to remove the damaged blood vessels.  We offer BBL for diffuse blood vessels and redness and the ND:Yag for stubborn individual vessels.  Several treatments are usually necessary.\\nTopical treatments have not been found to be helpful for removal.\\nGreen or Yellow tinted makeup tend to minimize the appearance of the redness.

## 2023-10-13 ENCOUNTER — EMERGENCY (EMERGENCY)
Facility: HOSPITAL | Age: 73
LOS: 1 days | Discharge: HOME | End: 2023-10-13
Admitting: EMERGENCY MEDICINE
Payer: MEDICARE

## 2023-10-13 VITALS
SYSTOLIC BLOOD PRESSURE: 122 MMHG | TEMPERATURE: 99 F | OXYGEN SATURATION: 97 % | HEART RATE: 85 BPM | RESPIRATION RATE: 18 BRPM | DIASTOLIC BLOOD PRESSURE: 77 MMHG

## 2023-10-13 VITALS
SYSTOLIC BLOOD PRESSURE: 122 MMHG | TEMPERATURE: 99 F | OXYGEN SATURATION: 97 % | RESPIRATION RATE: 18 BRPM | HEART RATE: 85 BPM | HEIGHT: 59 IN | DIASTOLIC BLOOD PRESSURE: 77 MMHG | WEIGHT: 106.92 LBS

## 2023-10-13 DIAGNOSIS — Z41.9 ENCOUNTER FOR PROCEDURE FOR PURPOSES OTHER THAN REMEDYING HEALTH STATE, UNSPECIFIED: Chronic | ICD-10-CM

## 2023-10-13 DIAGNOSIS — Z96.642 PRESENCE OF LEFT ARTIFICIAL HIP JOINT: Chronic | ICD-10-CM

## 2023-10-13 PROCEDURE — 99284 EMERGENCY DEPT VISIT MOD MDM: CPT

## 2023-10-13 PROCEDURE — 99284 EMERGENCY DEPT VISIT MOD MDM: CPT | Mod: 25

## 2023-10-13 PROCEDURE — 70450 CT HEAD/BRAIN W/O DYE: CPT | Mod: MA

## 2023-10-13 PROCEDURE — 73080 X-RAY EXAM OF ELBOW: CPT

## 2023-10-13 PROCEDURE — 70450 CT HEAD/BRAIN W/O DYE: CPT | Mod: 26,MA

## 2023-10-13 PROCEDURE — 73080 X-RAY EXAM OF ELBOW: CPT | Mod: 26,LT

## 2023-10-13 NOTE — ED ADULT NURSE NOTE - NSFALLRISKINTERV_ED_ALL_ED
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
## 2023-10-13 NOTE — ED ADULT NURSE NOTE - NSICDXPASTMEDICALHX_GEN_ALL_CORE_FT
PAST MEDICAL HISTORY:  Aspergillus pneumonia     History of rotator cuff tear left    FENG (mycobacterium avium-intracellulare) infection     Osteoporosis     Torn ligament right wrist

## 2023-10-13 NOTE — ED PROVIDER NOTE - NSCAREINITIATED _GEN_ER
clopidogrel (PLAVIX) 75 MG tablet   famotidine (PEPCID) 20 MG tablet   isosorbide mononitrate (IMDUR) 30 MG extended release tablet   lisinopril (PRINIVIL;ZESTRIL) 20 MG tablet   metoprolol succinate (TOPROL XL) 50 MG extended release tablet   Send to [employer] [phone] Lety Landon(PA)

## 2023-10-13 NOTE — ED PROVIDER NOTE - PHYSICAL EXAMINATION
CONSTITUTIONAL: Well-appearing;  in no apparent distress.   HEAD: Normocephalic; +swelling/tenderness to occiput   EYES: PERRL; EOM intact; conjunctiva and sclera clear  ENT: normal nose; no rhinorrhea; normal pharynx with no erythema or lesions.   NECK: Supple; non-tender;   CARDIOVASCULAR: rrr,  RESPIRATORY: Breathing easily;   MSK: FROM at all extremities, normal tone; L elbow- no ecchymosis, abrasion or deformity. +ttp over olecranon, FROM.   EXT: No cyanosis or edema; N/V intact  SKIN: Normal for age and race; warm; dry; good turgor; no apparent lesions or rash.  neuro: AAO x 3, CN II-XII intact, normal speech, strength 5/5 bilateral upper and lower extremities, sensation intact, cerebellum intact, no ataxia, follows commands appropriately
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
## 2023-10-13 NOTE — ED ADULT NURSE NOTE - NSICDXPASTSURGICALHX_GEN_ALL_CORE_FT
PAST SURGICAL HISTORY:  Elective surgery bilateral bunionectomy    Elective surgery left rotator cuff repaired    Elective surgery right wrist torn ligament repaired    Status post total hip replacement, left

## 2023-10-13 NOTE — ED PROVIDER NOTE - PATIENT PORTAL LINK FT
You can access the FollowMyHealth Patient Portal offered by [facility] by registering at the following website: [email or web address]. By joining [employer]’s FollowMyHealth portal, you will also be able to view your health information using other applications (apps) compatible with our system.

## 2023-10-13 NOTE — ED PROVIDER NOTE - NSFOLLOWUPINSTRUCTIONS_ED_ALL_ED_FT
Contusion    A contusion is a deep bruise. Contusions are the result of a blunt injury to tissues and muscle fibers under the skin. The skin overlying the contusion may turn blue, purple, or yellow. Symptoms also include pain and swelling in the injured area.    SEEK IMMEDIATE MEDICAL CARE IF YOU HAVE ANY OF THE FOLLOWING SYMPTOMS: severe pain, numbness, tingling, pain, weakness, or skin color/temperature change in any part of your body distal to the injury.    Closed Head Injury    A closed head injury is an injury to your head that may or may not involve a traumatic brain injury (TBI). Symptoms of TBI can be short or long lasting and include headache, dizziness, interference with memory or speech, fatigue, confusion, changes in sleep, mood changes, nausea, depression/anxiety, and dulling of senses. Make sure to obtain proper rest which includes getting plenty of sleep, avoiding excessive visual stimulation, and avoiding activities that may cause physical or mental stress. Avoid any situation where there is potential for another head injury, including sports.    SEEK IMMEDIATE MEDICAL CARE IF YOU HAVE ANY OF THE FOLLOWING SYMPTOMS: unusual drowsiness, vomiting, severe dizziness, seizures, lightheadedness, muscular weakness, different pupil sizes, visual changes, or clear or bloody discharge from your ears or nose.

## 2023-10-16 DIAGNOSIS — Z88.6 ALLERGY STATUS TO ANALGESIC AGENT: ICD-10-CM

## 2023-10-16 DIAGNOSIS — S50.02XA CONTUSION OF LEFT ELBOW, INITIAL ENCOUNTER: ICD-10-CM

## 2023-10-16 DIAGNOSIS — Z88.2 ALLERGY STATUS TO SULFONAMIDES: ICD-10-CM

## 2023-10-16 DIAGNOSIS — Z96.642 PRESENCE OF LEFT ARTIFICIAL HIP JOINT: ICD-10-CM

## 2023-10-16 DIAGNOSIS — Z79.82 LONG TERM (CURRENT) USE OF ASPIRIN: ICD-10-CM

## 2023-10-16 DIAGNOSIS — S00.03XA CONTUSION OF SCALP, INITIAL ENCOUNTER: ICD-10-CM
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
## 2023-10-16 DIAGNOSIS — R51.9 HEADACHE, UNSPECIFIED: ICD-10-CM
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
## 2024-02-27 ENCOUNTER — TRANSCRIPTION ENCOUNTER (OUTPATIENT)
Age: 74
End: 2024-02-27

## 2024-02-27 RX ORDER — CHLORHEXIDINE GLUCONATE 213 G/1000ML
1 SOLUTION TOPICAL DAILY
Refills: 0 | Status: DISCONTINUED | OUTPATIENT
Start: 2024-02-28 | End: 2024-02-28

## 2024-02-27 NOTE — ASU PATIENT PROFILE, ADULT - NS PREOP UNDERSTANDS INFO
no solids after 10pm water allowed up to 4am, bring ID and insurance card, no valuables or jewelry, wear comfortable clothing/yes

## 2024-02-27 NOTE — ASU PATIENT PROFILE, ADULT - NSICDXPASTSURGICALHX_GEN_ALL_CORE_FT
PAST SURGICAL HISTORY:  Elective surgery bilateral bunionectomy    Elective surgery left rotator cuff repaired    Elective surgery right wrist torn ligament repaired    H/O Achilles tendon repair     H/O Mycobacterium avium complex infection     OMAR on CPAP     Status post total hip replacement, left     Tear of left biceps muscle repair

## 2024-02-28 ENCOUNTER — TRANSCRIPTION ENCOUNTER (OUTPATIENT)
Age: 74
End: 2024-02-28

## 2024-02-28 ENCOUNTER — OUTPATIENT (OUTPATIENT)
Dept: OUTPATIENT SERVICES | Facility: HOSPITAL | Age: 74
LOS: 1 days | Discharge: HOME | End: 2024-02-28
Payer: MEDICARE

## 2024-02-28 VITALS
TEMPERATURE: 98 F | SYSTOLIC BLOOD PRESSURE: 116 MMHG | OXYGEN SATURATION: 99 % | DIASTOLIC BLOOD PRESSURE: 72 MMHG | HEART RATE: 76 BPM | RESPIRATION RATE: 16 BRPM

## 2024-02-28 VITALS
RESPIRATION RATE: 16 BRPM | HEIGHT: 59 IN | DIASTOLIC BLOOD PRESSURE: 75 MMHG | SYSTOLIC BLOOD PRESSURE: 129 MMHG | OXYGEN SATURATION: 97 % | TEMPERATURE: 98 F | WEIGHT: 106.26 LBS | HEART RATE: 66 BPM

## 2024-02-28 DIAGNOSIS — Z86.19 PERSONAL HISTORY OF OTHER INFECTIOUS AND PARASITIC DISEASES: Chronic | ICD-10-CM

## 2024-02-28 DIAGNOSIS — Z96.642 PRESENCE OF LEFT ARTIFICIAL HIP JOINT: Chronic | ICD-10-CM

## 2024-02-28 DIAGNOSIS — Z98.890 OTHER SPECIFIED POSTPROCEDURAL STATES: Chronic | ICD-10-CM

## 2024-02-28 DIAGNOSIS — Z41.9 ENCOUNTER FOR PROCEDURE FOR PURPOSES OTHER THAN REMEDYING HEALTH STATE, UNSPECIFIED: Chronic | ICD-10-CM

## 2024-02-28 DIAGNOSIS — S46.212A STRAIN OF MUSCLE, FASCIA AND TENDON OF OTHER PARTS OF BICEPS, LEFT ARM, INITIAL ENCOUNTER: Chronic | ICD-10-CM

## 2024-02-28 DIAGNOSIS — G47.33 OBSTRUCTIVE SLEEP APNEA (ADULT) (PEDIATRIC): Chronic | ICD-10-CM

## 2024-02-28 PROCEDURE — 88304 TISSUE EXAM BY PATHOLOGIST: CPT | Mod: 26

## 2024-02-28 RX ORDER — SODIUM CHLORIDE 9 MG/ML
500 INJECTION, SOLUTION INTRAVENOUS
Refills: 0 | Status: DISCONTINUED | OUTPATIENT
Start: 2024-02-28 | End: 2024-02-28

## 2024-02-28 RX ORDER — ACETAMINOPHEN 500 MG
1000 TABLET ORAL ONCE
Refills: 0 | Status: COMPLETED | OUTPATIENT
Start: 2024-02-28 | End: 2024-02-28

## 2024-02-28 RX ORDER — TIZANIDINE 4 MG/1
2 TABLET ORAL
Refills: 0 | DISCHARGE

## 2024-02-28 RX ORDER — UMECLIDINIUM BROMIDE AND VILANTEROL TRIFENATATE 62.5; 25 UG/1; UG/1
1 POWDER RESPIRATORY (INHALATION)
Refills: 0 | DISCHARGE

## 2024-02-28 RX ORDER — APREPITANT 80 MG/1
40 CAPSULE ORAL ONCE
Refills: 0 | Status: COMPLETED | OUTPATIENT
Start: 2024-02-28 | End: 2024-02-28

## 2024-02-28 RX ORDER — ROSUVASTATIN CALCIUM 5 MG/1
1 TABLET ORAL
Refills: 0 | DISCHARGE

## 2024-02-28 RX ORDER — FENTANYL CITRATE 50 UG/ML
25 INJECTION INTRAVENOUS
Refills: 0 | Status: DISCONTINUED | OUTPATIENT
Start: 2024-02-28 | End: 2024-02-28

## 2024-02-28 RX ORDER — ALBUTEROL 90 UG/1
2 AEROSOL, METERED ORAL
Refills: 0 | DISCHARGE

## 2024-02-28 RX ADMIN — APREPITANT 40 MILLIGRAM(S): 80 CAPSULE ORAL at 06:13

## 2024-02-28 RX ADMIN — CHLORHEXIDINE GLUCONATE 1 APPLICATION(S): 213 SOLUTION TOPICAL at 05:55

## 2024-02-28 RX ADMIN — Medication 1000 MILLIGRAM(S): at 06:13
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
## 2024-02-28 NOTE — BRIEF OPERATIVE NOTE - NSICDXBRIEFPROCEDURE_GEN_ALL_CORE_FT
PROCEDURES:  Left knee arthroscopy 28-Feb-2024 08:18:10  Nikos Craig   PROCEDURES:  Left knee arthroscopy 28-Feb-2024 08:18:10 excision of ganglion cyst Nikos Craig   PROCEDURES:  Left knee arthroscopy 28-Feb-2024 08:18:10 excision of ganglion cyst, chondroplasty, patella tendon repair, partial menisectomy Nikos Craig L

## 2024-02-28 NOTE — ASU DISCHARGE PLAN (ADULT/PEDIATRIC) - ASU DC SPECIAL INSTRUCTIONSFT
ACTIVITY:  - Weightbear as tolerated with assistive device if needed. No strenuous activity, heavy lifting, driving or returning to work until cleared by MD.  - You may experience postoperative swelling on the operative extremity. You may ice the surgery site for 20 minute intervals.    DRESSING: gauze/ace wrap  - Keep dressing on till follow up with Dr. Cooley (on Monday 3/4/24). Do not soak in bathtubs. Remove dressing after postop day 7, then leave incision open to air.  - Keep your incision clean and dry. Do not pick at your incision. Do not apply creams, ointments or oils to your incision until cleared by your surgeon. Do not soak your incision in sitting water (ie tubs, pools, lakes, etc.) until cleared by your surgeon.     MEDICATION/ANTICOAGULATION:  -You have been prescribed aspirin, as a preventative to help prevent postoperative blood clots. Please take this medication as prescribed.   - You have been prescribed medications for pain:   Take this medication as prescribed. This medication may cause drowsiness or dizziness. Do not operate machinery. This medication may cause constipation.  - For any additional medications (antibiotics), follow instructions on the bottle.   -Try to have regular bowel movements. Take stool softener or laxative if necessary. You may wish to take Miralax daily until you have regular bowel movements.   - If you have been prescribed Aspirin or an anti-inflammatory, please take Prilosec once a day, before breakfast, until no longer taking Aspirin or anti-inflammatory. This will help protect your stomach.  - If you have a pain management physician, please follow-up with them postoperatively.   - If you experience any negative side effects of your medications, please call your surgeon's office to discuss.    Follow-up:  - Call to schedule an appt with Dr. Cooley for follow up. If you have staples or sutures they will be removed in office.  - Please follow-up with your primary care physician or any other specialist you see postoperatively, if needed.   - Contact your doctor if you experience: fever greater than 101.5, chills, chest pain, difficulty breathing, redness or excessive drainage around the incision, other concerns.

## 2024-02-28 NOTE — ASU DISCHARGE PLAN (ADULT/PEDIATRIC) - CARE PROVIDER_API CALL
Mitchel Cooley; MBBS)  Orthopaedic Surgery  [address]  [place] [address]  Phone: [phone]  Fax: [phone]  Follow Up Time:

## 2024-03-05 LAB — SURGICAL PATHOLOGY STUDY: SIGNIFICANT CHANGE UP

## 2024-04-05 ENCOUNTER — EMERGENCY (EMERGENCY)
Facility: HOSPITAL | Age: 74
LOS: 1 days | Discharge: HOME | End: 2024-04-05
Admitting: EMERGENCY MEDICINE
Payer: MEDICARE

## 2024-04-05 VITALS
HEIGHT: 59 IN | TEMPERATURE: 98 F | WEIGHT: 108.03 LBS | RESPIRATION RATE: 18 BRPM | OXYGEN SATURATION: 99 % | HEART RATE: 62 BPM | DIASTOLIC BLOOD PRESSURE: 69 MMHG | SYSTOLIC BLOOD PRESSURE: 116 MMHG

## 2024-04-05 DIAGNOSIS — G47.33 OBSTRUCTIVE SLEEP APNEA (ADULT) (PEDIATRIC): Chronic | ICD-10-CM

## 2024-04-05 DIAGNOSIS — Z98.890 OTHER SPECIFIED POSTPROCEDURAL STATES: Chronic | ICD-10-CM

## 2024-04-05 DIAGNOSIS — Z41.9 ENCOUNTER FOR PROCEDURE FOR PURPOSES OTHER THAN REMEDYING HEALTH STATE, UNSPECIFIED: Chronic | ICD-10-CM

## 2024-04-05 DIAGNOSIS — Z96.642 PRESENCE OF LEFT ARTIFICIAL HIP JOINT: Chronic | ICD-10-CM

## 2024-04-05 DIAGNOSIS — Z86.19 PERSONAL HISTORY OF OTHER INFECTIOUS AND PARASITIC DISEASES: Chronic | ICD-10-CM

## 2024-04-05 DIAGNOSIS — S46.212A STRAIN OF MUSCLE, FASCIA AND TENDON OF OTHER PARTS OF BICEPS, LEFT ARM, INITIAL ENCOUNTER: Chronic | ICD-10-CM

## 2024-04-05 PROCEDURE — 73562 X-RAY EXAM OF KNEE 3: CPT | Mod: 26,RT

## 2024-04-05 PROCEDURE — 70450 CT HEAD/BRAIN W/O DYE: CPT | Mod: MC

## 2024-04-05 PROCEDURE — 99284 EMERGENCY DEPT VISIT MOD MDM: CPT

## 2024-04-05 PROCEDURE — 70450 CT HEAD/BRAIN W/O DYE: CPT | Mod: 26,MC

## 2024-04-05 PROCEDURE — 99284 EMERGENCY DEPT VISIT MOD MDM: CPT | Mod: 25

## 2024-04-05 PROCEDURE — 73562 X-RAY EXAM OF KNEE 3: CPT

## 2024-04-05 RX ORDER — ACETAMINOPHEN 500 MG
1000 TABLET ORAL ONCE
Refills: 0 | Status: COMPLETED | OUTPATIENT
Start: 2024-04-05 | End: 2024-04-05

## 2024-04-05 RX ADMIN — Medication 1000 MILLIGRAM(S): at 11:27

## 2024-04-05 NOTE — ED PROVIDER NOTE - MDM ORDERS SUBMITTED SELECTION
Called patient and informed him of H&E/pathology shows a contact dermatitis, no organisms seen which is reassuring for infection. However, cultures are still pending. In meantime safe to start topical triamcinolone as discussed to site BID. Will f/u once cultures are complete.    Patient did not have any questions      Imaging Studies/Medications

## 2024-04-05 NOTE — ED PROVIDER NOTE - CHIEF COMPLAINT
[facility]    Progress Note    Ariel Barboza Patient Status:  Inpatient    1956 MRN [id number]   Location [address] [facility] Attending Ry Magaña MD   Hosp Day # 7 PCP JAELYN VALLES        Subjective:     Constitutional: Neg 05/05/2020    ALT 16 05/05/2020    DDIMER 3.93 (H) 05/09/2020    CRP 1.27 (H) 05/09/2020    TROP <0.045 05/05/2020                        Morris Collins MD  5/12/2020 The patient is a 73y Female complaining of head injury.

## 2024-04-05 NOTE — ED ADULT NURSE NOTE - NSFALLUNIVINTERV_ED_ALL_ED
Bed/Stretcher in lowest position, wheels locked, appropriate side rails in place/Call bell, personal items and telephone in reach/Instruct patient to call for assistance before getting out of bed/chair/stretcher/Non-slip footwear applied when patient is off stretcher/[place] to call system/Physically safe environment - no spills, clutter or unnecessary equipment/Purposeful proactive rounding/Room/bathroom lighting operational, light cord in reach

## 2024-04-05 NOTE — ED PROVIDER NOTE - MUSCULOSKELETAL, MLM
Spine appears normal, range of motion is not limited, no muscle or joint tenderness; R knee: + contusion to anterior aspect, no patella tend, quads intact, FROM, normal gait
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
## 2024-04-05 NOTE — ED PROVIDER NOTE - CLINICAL SUMMARY MEDICAL DECISION MAKING FREE TEXT BOX
pt s/p fall - injured r knee and hit forehead, no loc/ha, ambulatory w/o dif, + contusion to head and knee, ct head neg, xray knee neg, ace wrap applied for ambulatory comfort / support, given tyl, to RICE, head injury precautions discussed, stable for dc, pt understands and agrees w/plan, strict return precautions given

## 2024-04-05 NOTE — ED PROVIDER NOTE - OBJECTIVE STATEMENT
The pt is a 74 y/o F, who presents to ED s/p fall - hit head and landed on R knee. Pt states pain is 3/10, has not taken any pain meds, no blood thinners. Denies loc, visual changes, n/v, neck or back pain, inability to walk, any other injuries.

## 2024-04-05 NOTE — ED ADULT NURSE NOTE - CAS DISCH CONDITION
Protecting Yourself From the Sun    · Apply broad spectrum water resistant sunscreen with an SPF of at least 30 to exposed areas of the skin. Dont forget the ears and lips! Remember to reapply sunscreen about every 2 hours and after swimming or sweating. · Wear sun protective clothing. Swim shirts (aka. rash guards) are a great idea and negates the need to reapply sunscreen in those areas. · Seek the shade whenever possible especially between the hours of 10 am and 4 pm when the suns rays are the strongest.     · Avoid tanning beds       Biopsy Wound Care Instructions    · Keep the bandage in place for 24 hours. · Cleanse the wound with mild soapy water daily   Gently dry the area.  Apply Vaseline or petroleum jelly to the wound using a cotton tipped applicator.  Cover with a clean bandage.  Repeat this process until the biopsy site is healed.  If you had stitches placed, continue treating the site until the stitches are removed. Remember to make an appointment to return to have your stitches removed by our staff.  You may shower and bathe as usual.       ** Biopsy results generally take around 7 business days to come back. If you have not heard from us by then, please call the office at [phone] between 8AM and 4PM Monday through Friday.     ** REMOVE STITCH(ES) IN 14 DAYS **
Stable

## 2024-04-05 NOTE — ED ADULT NURSE NOTE - NSHOSCREENINGQ1_ED_ALL_ED
Nutrition/ Diabetes MNT Follow-up Progress Report    Rahel Tavarez was seen from 12:25 to 1:25 pm for diabetes self-management training, including nutrition and diabetes education in an individual setting for a diagnosis of Type 2 diabetes mellitus with complication, with long-term current use of insulin (CMS/Formerly [facility]) [E11.8, Z79.4]      Barriers and Readiness to Learning:  The instruction was given to the patient/Caregiver/ present: NONE  Barriers to self-care and learning limitations:physical limitations and financial limitations      Preferences for Learning: visual/verbal and observation, reading  and repetition   Readiness to learn: The patient demonstrates the ability to understand and asks questions.    Assessment and Plan:  Learning needs were assessed and the patient requires education in diabetes disease process/treatment options, medical nutrition therapy, physical acitivity, blood glucose monitoring, diabetes medications, acute complications, chronic complications, diabetes psychosocial adjustment and strategies and goal setting.     Material was presented using verbal, written, demonstration and return demonstration.     Assessment:  Food and Nutrition Related Changes since last visit:  Reports drinking more water now in place of regular soda.  Upon questioning, admits she still does drink 20-24 ounces of soda daily.    Self-reported adherence score: good    Physical Activity changes:   In rehab due to 12 day hospital stay and using a walker    Anthropometric Measurements:  Estimated body mass index is 39.58 kg/m² as calculated from the following:    Height as of 7/19/17: 5' 4\" (1.626 m).    Weight as of 7/19/17: 104.6 kg.    Biochemical Data, Medical Tests, and Procedures:  Hemoglobin A1C (%)   Date Value   07/19/2017 6.4 (H)   03/06/2017 6.5 (H)   06/13/2016 6.0 (H)   01/05/2016 5.4   08/03/2015 6.9 (H)      Glucose (mg/dL)   Date Value   07/19/2017 176 (H)   07/03/2017 128 (H)   07/02/2017  185 (H)     CHOLESTEROL (mg/dL)   Date Value   02/29/2016 144      HDL (mg/dL)   Date Value   02/29/2016 39 (L)    No components found for: LDL   TRIGLYCERIDE (mg/dL)   Date Value   02/29/2016 125      CALCULATED LDL (mg/dL)   Date Value   02/29/2016 80    No components found for: MICROALBUMIN    Current medications, vitamins, herbals, and nonprescription medications:      Current Outpatient Prescriptions   Medication Sig Dispense Refill   • furosemide (LASIX) 20 MG tablet Take 1 tablet by mouth daily. 30 tablet 0   • allopurinol (ZYLOPRIM) 300 MG tablet Take 1 tablet by mouth daily. 30 tablet 0   • amLODIPine (NORVASC) 10 MG tablet Indications: High Blood Pressure Take 1 tablet by mouth daily 30 tablet 0   • metoPROLOL (TOPROL-XL) 100 MG 24 hr tablet Take 1 tablet by mouth daily. 30 tablet 0   • pregabalin (LYRICA) 50 MG capsule Take 1 capsule by mouth every 12 hours. 60 capsule 0   • Apremilast (OTEZLA) 30 MG Tab Take 1 tablet by mouth 2 times daily. 60 tablet 0   • predniSONE (DELTASONE) 10 MG tablet Take two tablets daily for five days, one tablet daily for 5 days 15 tablet 0   • oxyCODONE/APAP (PERCOCET)  MG per tablet Take 1 tablet by mouth every 6 hours as needed for Pain. 120 tablet 0   • pramipexole (MIRAPEX) 0.5 MG tablet Take 1 tablet by mouth at bedtime. 30 tablet 1   • Insulin Glargine (TOUJEO SOLOSTAR SC) Inject 38 Units into the skin daily.      • Insulin Glulisine (APIDRA SOLOSTAR SC) Inject into the skin 3 times daily. Inject per sliding scale     • levothyroxine (SYNTHROID, LEVOTHROID) 150 MCG tablet Take 150 mcg by mouth daily.     • pantoprazole (PROTONIX) 40 MG tablet Take 1 tablet by mouth daily. 90 tablet 3   • clopidogrel (PLAVIX) 75 MG tablet TAKE ONE TABLET BY MOUTH EVERY DAY 90 tablet 3   • ergocalciferol (DRISDOL) 97347 units capsule Take 1 capsule by mouth once a week. 12 capsule 3   • atorvastatin (LIPITOR) 20 MG tablet Take 1 tablet by mouth nightly. Indications: High cholesterol  30 tablet 6   • alendronate (FOSAMAX) 70 MG tablet Take 1 tablet by mouth every 7 days. 12 tablet 4   • acetaminophen (TYLENOL) 500 MG tablet Take 500 mg by mouth every 6 hours as needed for Pain or Fever.     • chlorzoxazone (PARAFON FORTE) 500 MG tablet Take 500 mg by mouth 3 times daily as needed for Muscle spasms.     • tamsulosin (FLOMAX) 0.4 MG Cap Take 1 capsule by mouth daily after a meal. 30 capsule 2   • busPIRone (BUSPAR) 10 MG tablet Take 1 tablet by mouth 2 times daily. 60 tablet 2   • buPROPion (WELLBUTRIN XL) 300 MG 24 hr tablet Take 1 tablet by mouth every morning. 30 tablet 6   • DULoxetine (CYMBALTA) 60 MG capsule Take 1 capsule by mouth 2 times daily. For anxiety 60 capsule 6   • zolpidem (AMBIEN) 5 MG tablet Take 1 tablet by mouth nightly as needed for Sleep. 30 tablet 5   • aspirin 81 MG EC tablet TAKE ONE TABLET BY MOUTH EVERY EVENING 90 tablet 3   • fluticasone (FLONASE) 50 MCG/ACT nasal spray INSTILL 2 SPRAYS INTO EACH NOSTRIL ONCE DAILY AS NEEDED FOR ALLERGIES 16 g 5   • albuterol 108 (90 BASE) MCG/ACT inhaler Inhale 2 puffs into the lungs every 4 hours as needed for Shortness of Breath or Wheezing. 1 Inhaler 0     No current facility-administered medications for this visit.        Nutrition Diagnosis:  No changes since initial/last visit .    Nutrition Prescription:  No changes since initial/last visit.    Intervention:  Nutrition-Related Medication Management  Medications ND-6.1: is not taking meal time insulin regularly.    Nutrition Education:   Nutrition Education - Content (1)   Focused on:     Purpose of the nutrition education E-1.1:  review.     Nutrition Education - Application (2)    Skill development in the following areas E-2.2:  Nutrition - Effect of timing, amount and type of carbohydrates on blood sugar,  Effect of portion size,  Effect of modest weight loss on blood sugar control,  Understanding of healthy food preparation (cooking methods, recipe modifcation),  Other STOP  DRINKING REGULAR SODA!  Competent .      Diabetes Disease Process and Treatment Options:  Causes, risk factors, Importance of diabetes control, Ongoing education and Possible treatment changes/options.  Competent    Physical Activity - Incorporating Activity into Lifestyle       Effects of physical activity on blood sugar, general health benefits, hypoglycemia prevention and Developing a physical activity plan/goals (types, frequency, duration, intensity, medical clearance)  Competent                                             Blood Glucose Monitoring - Blood Sugars:  FBS ranges at home: 100-170 mg/dL  Ranges of other home BS:   BS at office visit:    Reviewed testing technique, times, record keeping, blood sugar targets, and sharps disposal.      Competent    Diabetes Medications  (dose, schedule, action, and side effects)  Competent    Acute Complications - Prevention Detection and Treatment:  Hypoglycemia (risks, causes, signs, symptoms, treatment, and prevention), Hyperglycemia (risks, causes, signs, symptoms, treatment, and prevention), Problem solving and when to call their provider, Safe driving practices and Medical identification use.  Competent    Chronic Complications - Prevention, Detection & Treatment:  Risk reduction strategies for prevention and treatment of nephropathy, retinopathy, neuropathy, frequent infections, cardiovascular disease, Essential care guidelines (MD/Dental,Eye exams, Lower extremity exams), Daily self exams of feet, Signs/symptoms of problems or infections, Skin care, skin hygiene hygiene and General health benefits.  Competent    Diabetes Psychosocial Adjustment and Strategies:  Effects of stress on blood glucose, healthy coping strategies and problem-solving to identify individual stress triggers  Provided with community resources and support information  Competent      Nutrition Counseling:      Theoretical Basis/Approach (1)   Cognitive-Behavioral Theory, Health Belief Model  and Social Learning Model   Utilizing:  Motivational interviewing, Goal setting, Self-monitoring, Problem solving, Social support and Stress management.    Coordination of Nutrition Care:   NONE      Print/Written Resources Provided:   BOOK ON DIABETIC EYE DISEASE    Plan to evaluate Conflict with personal/family value system:  BRINGS HOME REGULAR SODA AND IS FINE WITH HER DRINKING IT DESPITE DIABETES  Motivation: USURE WHETHER SHE IS MOTIVATED TO CHANGE THIS HABIT.  SHE HAS CUT DOWN ON SODA INTAKE.  USED TO DRINK ONLY SODA AND NO WATER.  Readiness to change nutrition-related behaviors: UNSURE, ENCOURAGEMENT GIVEN  Unscientific belifes/attitudes: BELIEVES THAT DIET SODA IS BAD FOR YOU, THEREFORE, ONE SHOULD DRINK REGULAR SODA.    Goal Setting to Promote Health & Problem Solving for Daily Living was discussed.  PREVIOUS GOAL:  Check blood sugars 2 times per day.  Patient achieved goal 25% of the time.  Patient selected goal of:   No sugared drinks.  Choose water and aim for 48-64 ounces each day  Patient achieved goal NEW% of the time.  The patient will carry their diabetes identification at all times.  Patient achieved goal 100% of the time.    Diabetes Self-Management Support Plan:    Diabetes Support Group X Family Support    Diabetes Support Website  Exercise Facility:   X Diabetes Educator Contact Number  [employer] [facility]:    Care Management Program  Food/Weight Counseling Program    Patient Assistance Program  Annual Program Review    Community Program: Living Well with Chronic Conditions Classes  Behavioral Change Specialist    Community Program: Healthy Living with Diabetes  Websites: [email or web address].org    Exercise partner  Lunch rossana       The patient was encouraged to call back with any questions or concerns.    Return in about 4 months (around 11/24/2017).    See Patient Instructions for further information.    Thank you for your referral.    Please contact me with any questions/concerns.    Mabel  ANTONIO Reed, CDE  Nutrition and Diabetes Education    [place] [phone]  [facility] [phone]    The report was sent to referring provider: Dr. Norm Santacruz    The MD referral is located in EMR.    Supervising Physician present onsite is:  Rey Siegel MD      Diabetes Education/Medical Nutrition Therapy Referral Expires: 2/27/2018     Medicare hours remaining: DSMT: 9  MNT: 1                  No

## 2024-04-05 NOTE — ED PROVIDER NOTE - PATIENT PORTAL LINK FT
You can access the FollowMyHealth Patient Portal offered by [facility] by registering at the following website: [email or web address]. By joining [employer]’s FollowMyHealth portal, you will also be able to view your health information using other applications (apps) compatible with our system.

## 2024-04-05 NOTE — ED PROVIDER NOTE - ENMT, MLM
Airway patent, Nasal mucosa clear. Mouth with normal mucosa. Throat has no vesicles, no oropharyngeal exudates and uvula is midline. Ears: no hemotympanum b/l, + 3 cm contusion to forehead, no lac, no bleeding, no orbital tend b/l, no step offs, no guido signs b/l, no raccoon eyes b/l

## 2024-04-05 NOTE — ED PROVIDER NOTE - NSFOLLOWUPINSTRUCTIONS_ED_ALL_ED_FT
Closed Head Injury  A closed head injury is an injury to your head that may or may not involve a traumatic brain injury (TBI). Symptoms of TBI can be short or long lasting and include headache, dizziness, interference with memory or speech, fatigue, confusion, changes in sleep, mood changes, nausea, depression/anxiety, and dulling of senses. Make sure to obtain proper rest which includes getting plenty of sleep, avoiding excessive visual stimulation, and avoiding activities that may cause physical or mental stress. Avoid any situation where there is potential for another head injury, including sports.  SEEK IMMEDIATE MEDICAL CARE IF YOU HAVE ANY OF THE FOLLOWING SYMPTOMS: unusual drowsiness, vomiting, severe dizziness, seizures, lightheadedness, muscular weakness, different pupil sizes, visual changes, or clear or bloody discharge from your ears or nose.  Contusion  A contusion is a deep bruise. This is a result of an injury that causes bleeding under the skin. Symptoms of bruising include pain, swelling, and discolored skin. The skin may turn blue, purple, or yellow.  Follow these instructions at home:  Managing pain, stiffness, and swelling  Bag of ice on a towel on the skin.  You may use RICE. This stands for:  Resting.  Icing.  Compression, or putting pressure on the injured area.  Elevating, or raising the injured area.  To follow this method, do these actions:  Rest the injured area.  If told, put ice on the injured area. To do this:  Put ice in a plastic bag.  Place a towel between your skin and the bag.  Leave the ice on for 20 minutes, 2–3 times per day.  If your skin turns bright red, take off the ice right away to prevent skin damage. The risk of skin damage is higher if you cannot feel pain, heat, or cold.  If told, apply compression on the injured area using an elastic bandage. Make sure the bandage is not too tight. If the area tingles or has a loss of feeling (numbness), remove it and put it back on as told by your doctor.  If possible, elevate the injured area above the level of your heart while you are sitting or lying down.  General instructions  Take over-the-counter and prescription medicines only as told by your doctor.  Keep all follow-up visits. Your doctor may want to see how your contusion is healing with treatment.  Contact a doctor if:  Your symptoms do not get better after several days of treatment.  Your symptoms get worse.  You have trouble moving the injured area.  Get help right away if:  You have very bad pain.  You have a loss of feeling (numbness) in a hand or foot.  Your hand or foot turns pale or cold.

## 2024-04-05 NOTE — ED ADULT TRIAGE NOTE - CHIEF COMPLAINT QUOTE
Patient to the ED c/o mechanical trip and fall over dogs while walking on sidewalk @ 0900 this AM, -LOC, -AC. Hematoma to forehead, no other signs of trauma/injury, AAOx4, NAD.

## 2024-04-08 DIAGNOSIS — S80.01XA CONTUSION OF RIGHT KNEE, INITIAL ENCOUNTER: ICD-10-CM

## 2024-04-08 DIAGNOSIS — S09.90XA UNSPECIFIED INJURY OF HEAD, INITIAL ENCOUNTER: ICD-10-CM

## 2024-04-08 DIAGNOSIS — S00.83XA CONTUSION OF OTHER PART OF HEAD, INITIAL ENCOUNTER: ICD-10-CM

## 2024-04-08 DIAGNOSIS — Z88.6 ALLERGY STATUS TO ANALGESIC AGENT: ICD-10-CM

## 2024-04-08 DIAGNOSIS — Z88.2 ALLERGY STATUS TO SULFONAMIDES: ICD-10-CM
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
## 2024-04-08 DIAGNOSIS — Z88.5 ALLERGY STATUS TO NARCOTIC AGENT: ICD-10-CM
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
## 2024-08-05 ENCOUNTER — APPOINTMENT (OUTPATIENT)
Dept: ORTHOPEDIC SURGERY | Facility: CLINIC | Age: 74
End: 2024-08-05

## 2024-10-01 LAB
CANCER AG125 SERPL-ACNC: 15 U/ML
CANCER AG19-9 SERPL-ACNC: 7 U/ML
CEA SERPL-MCNC: 0.7 NG/ML

## 2024-10-03 ENCOUNTER — EMERGENCY (EMERGENCY)
Facility: HOSPITAL | Age: 74
LOS: 1 days | Discharge: HOME | End: 2024-10-03
Attending: STUDENT IN AN ORGANIZED HEALTH CARE EDUCATION/TRAINING PROGRAM | Admitting: STUDENT IN AN ORGANIZED HEALTH CARE EDUCATION/TRAINING PROGRAM
Payer: MEDICARE

## 2024-10-03 VITALS
WEIGHT: 121.47 LBS | OXYGEN SATURATION: 96 % | TEMPERATURE: 98 F | SYSTOLIC BLOOD PRESSURE: 136 MMHG | DIASTOLIC BLOOD PRESSURE: 81 MMHG | HEART RATE: 90 BPM | RESPIRATION RATE: 18 BRPM | HEIGHT: 59 IN

## 2024-10-03 DIAGNOSIS — S46.212A STRAIN OF MUSCLE, FASCIA AND TENDON OF OTHER PARTS OF BICEPS, LEFT ARM, INITIAL ENCOUNTER: Chronic | ICD-10-CM
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
## 2024-10-03 DIAGNOSIS — G47.33 OBSTRUCTIVE SLEEP APNEA (ADULT) (PEDIATRIC): Chronic | ICD-10-CM

## 2024-10-03 DIAGNOSIS — S39.92XA UNSPECIFIED INJURY OF LOWER BACK, INITIAL ENCOUNTER: ICD-10-CM

## 2024-10-03 DIAGNOSIS — Z88.5 ALLERGY STATUS TO NARCOTIC AGENT: ICD-10-CM

## 2024-10-03 DIAGNOSIS — Z41.9 ENCOUNTER FOR PROCEDURE FOR PURPOSES OTHER THAN REMEDYING HEALTH STATE, UNSPECIFIED: Chronic | ICD-10-CM

## 2024-10-03 DIAGNOSIS — Z86.19 PERSONAL HISTORY OF OTHER INFECTIOUS AND PARASITIC DISEASES: Chronic | ICD-10-CM

## 2024-10-03 DIAGNOSIS — Z96.642 PRESENCE OF LEFT ARTIFICIAL HIP JOINT: Chronic | ICD-10-CM

## 2024-10-03 DIAGNOSIS — Z88.2 ALLERGY STATUS TO SULFONAMIDES: ICD-10-CM

## 2024-10-03 DIAGNOSIS — Z88.8 ALLERGY STATUS TO OTHER DRUGS, MEDICAMENTS AND BIOLOGICAL SUBSTANCES: ICD-10-CM

## 2024-10-03 DIAGNOSIS — Z88.6 ALLERGY STATUS TO ANALGESIC AGENT: ICD-10-CM

## 2024-10-03 DIAGNOSIS — Z98.890 OTHER SPECIFIED POSTPROCEDURAL STATES: Chronic | ICD-10-CM
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
## 2024-10-03 PROCEDURE — 72220 X-RAY EXAM SACRUM TAILBONE: CPT | Mod: 26

## 2024-10-03 PROCEDURE — 99284 EMERGENCY DEPT VISIT MOD MDM: CPT

## 2024-10-03 NOTE — ED PROVIDER NOTE - PHYSICAL EXAMINATION
general: Well appearing, in no acute distress  HEENT: Normocephalic, atraumatic, extraocular movements intact  CV: Regular rate  Pulm: No respiratory distress, no tachypnea  Abd: Flat, no gross distension, soft, NT  Ext: warm and well perfused  MSK: + pain along coccyx  Skin: No gross rashes or lesions  Neuro: Alert and oriented, moving all extremities, normal gait, cranial 2-12 intact, motor station intact bilaterally

## 2024-10-03 NOTE — ED PROVIDER NOTE - OBJECTIVE STATEMENT
74-year-old female not on AC presenting after fall.  Patient states medium-size dog jumped on her, patient fell onto her buttock, no head trauma or LOC.  Complaining of pain by her coccyx when she sits, able to ambulate without difficulty, no numbness or tingling, no headache or blurry vision, no chest pain or abdominal pain.  No other acute complaints.  ROS as above.

## 2024-10-03 NOTE — ED PROVIDER NOTE - NSFOLLOWUPINSTRUCTIONS_ED_ALL_ED_FT
Please take Tylenol as needed for pain.  Return for worsening symptoms, worsening pain, numbness, weakness, tingling.  Otherwise, please follow-up with your primary physician.

## 2024-10-03 NOTE — ED ADULT NURSE NOTE - NSFALLRISKINTERV_ED_ALL_ED

## 2024-10-03 NOTE — ED PROVIDER NOTE - PATIENT PORTAL LINK FT
75F with right knee pain x years without specific accident or injury to bring on pain. Pt attributes pain to frequent falls. She ambulates with a cane intermittently at baseline. Pain persists despite conservative measures including PT. Denies numbness, tingling, paresthesias to BLE.  Presents for elective R TKR.
You can access the FollowMyHealth Patient Portal offered by [facility] by registering at the following website: [email or web address]. By joining [email or web address]’s FollowMyHealth portal, you will also be able to view your health information using other applications (apps) compatible with our system.

## 2024-10-03 NOTE — ED ADULT NURSE NOTE - OBJECTIVE STATEMENT
s/p mechanical trip and fall, landed on her buttocks, did not hit hear head, not on blood thinners.  States was walking in the street when a neighbors dog jumped on her knocking her down.  Able to ambulate w/ use of cane, c/o of lower back/coccyx pain worse on sitting down.  Last took Tylenol PO 2 hours PTA to ED.

## 2024-10-03 NOTE — ED PROVIDER NOTE - CLINICAL SUMMARY MEDICAL DECISION MAKING FREE TEXT BOX
74-year-old female presenting with fall, buttock pain by coccyx, will obtain x-ray, assess for fracture, neuro intact, offered analgesia, patient declines.

## 2024-10-03 NOTE — ED ADULT TRIAGE NOTE - CHIEF COMPLAINT QUOTE
s/p fall due to neighbor dog jumped on her resulted to lower back pain, denies loc and  blood thinner, took 2  tylenol about an hour ago

## 2024-10-03 NOTE — ED ADULT NURSE NOTE - NSICDXPASTMEDICALHX_GEN_ALL_CORE_FT
INDIVIDUAL PLAN OF CARE   [facility]    Member Name: Shanna Shanks; YOB: 1974  MRN: [id number]  3/31/2022    Goals developed in collaboration with: member  Staff responsible for plan: Analisa LOWE  1. Long Term Goal (concrete, measurable, and time specific outcomes):  Member will maintain present level of physical and cognitive function through active participation in structured [facility] programming with staff set up, facilitation, and supervision provided every day of program attendance.  Target Date:  October 2024  Bi-Annual Review:  Goal remains appropriate.    2. Short Term Goal: (concrete, measurable, and time specific outcomes):  To maintain physical strength and endurance, member will actively participate in prescribed Nu-Step exercise program, with set up, VC's and supervision provided by staff, for at least 20 minutes, at least 1x/week during program attendance.  Target Date:  April 2024  Bi-Annual Review:  Goal remains appropriate.  Shanna requires VC's and positive reinforcement to participate in Nu Step activity.    3. Short Term Goal: (concrete, measurable, and time specific outcomes):  To maintain current level of cognitive function, member will actively participate in therapeutic creative arts and Brain/Body programming with set up, VC's and supervision provided by staff every day of program attendance.  Target Date:  April 2024  Bi-Annual Review:  Goal remains appropriate.       PAST MEDICAL HISTORY:  Aspergillus pneumonia     History of rotator cuff tear left    FENG (mycobacterium avium-intracellulare) infection     Osteoporosis     Torn ligament right wrist

## 2024-10-03 NOTE — ED PROVIDER NOTE - NS ED ROS FT

## 2024-10-08 ENCOUNTER — APPOINTMENT (OUTPATIENT)
Dept: MRI IMAGING | Facility: CLINIC | Age: 74
End: 2024-10-08
Payer: MEDICARE

## 2024-10-08 PROCEDURE — A9585: CPT | Mod: JW

## 2024-10-08 PROCEDURE — 74183 MRI ABD W/O CNTR FLWD CNTR: CPT | Mod: 26

## 2024-11-20 PROCEDURE — 99283 EMERGENCY DEPT VISIT LOW MDM: CPT | Mod: 25

## 2024-11-20 PROCEDURE — 72220 X-RAY EXAM SACRUM TAILBONE: CPT

## 2024-12-19 NOTE — ED ADULT TRIAGE NOTE - HEIGHT IN FEET
Patient had follow up with you today that was cancelled. Patient rescheduled himself for April and doesn't believe he needs to be sooner. States he is in good health and traveling. Would like his medication refilled. Do we need to reschedule for something sooner or okay to keep April jerrod?    Please advise, thank you.   4

## (undated) DEVICE — PACK KNEE ARTHROSCOPY

## (undated) DEVICE — SUT MONOCRYL 3-0 18" PS-2 UNDYED

## (undated) DEVICE — POSITIONER FOAM EGG CRATE ULNAR 2PCS (PINK)

## (undated) DEVICE — BLADE SURGICAL #15 CARBON

## (undated) DEVICE — GLV 7.5 PROTEXIS (WHITE)

## (undated) DEVICE — ARTHREX KIT ACL TRANSTIBIAL W SAW BLADE

## (undated) DEVICE — SUT VICRYL PLUS 2-0 27" SH UNDYED

## (undated) DEVICE — SHAVER BLADE S&N FULL RADIUS BONECUTTER PLATINUM 4.5MM (YELLOW)

## (undated) DEVICE — ARTHREX UNIVERS APEX SUTURE KIT

## (undated) DEVICE — ARTHREX TORPEDO 4MMX13CM

## (undated) DEVICE — DRSG KLING 4"

## (undated) DEVICE — TOURNIQUET CUFF 34" DUAL PORT W PLC

## (undated) DEVICE — ARTHREX ARTHROSCOPY TUBING

## (undated) DEVICE — ARTHREX PORTAL SKID DISP

## (undated) DEVICE — SHAVER BLADE S&N FULL RADIUS 3.5MM STRAIGHT (BEIGE)

## (undated) DEVICE — SUT TENSIONER CUTTER 2-0/0

## (undated) DEVICE — S&N ARTHROCARE WAND COBLATION WEREWOLF FLOW 90

## (undated) DEVICE — DRSG STOCKINETTE IMPERVIOUS XL

## (undated) DEVICE — SLV COMPRESSION KNEE MED

## (undated) DEVICE — SOL IRR BAG NS 0.9% 3000ML

## (undated) DEVICE — DRSG COBAN 3" LF NONSTERILE

## (undated) DEVICE — SUT VICRYL 1 36" CT-1 UNDYED